# Patient Record
Sex: FEMALE | Race: WHITE
[De-identification: names, ages, dates, MRNs, and addresses within clinical notes are randomized per-mention and may not be internally consistent; named-entity substitution may affect disease eponyms.]

---

## 2019-01-07 ENCOUNTER — HOSPITAL ENCOUNTER (OUTPATIENT)
Dept: HOSPITAL 46 - DS | Age: 79
Discharge: HOME | End: 2019-01-07
Attending: UROLOGY
Payer: MEDICARE

## 2019-01-07 VITALS — WEIGHT: 145 LBS | BODY MASS INDEX: 23.3 KG/M2 | HEIGHT: 66 IN

## 2019-01-07 DIAGNOSIS — Z88.5: ICD-10-CM

## 2019-01-07 DIAGNOSIS — Z79.899: ICD-10-CM

## 2019-01-07 DIAGNOSIS — Z79.84: ICD-10-CM

## 2019-01-07 DIAGNOSIS — N32.81: Primary | ICD-10-CM

## 2019-01-07 DIAGNOSIS — M81.0: ICD-10-CM

## 2019-01-07 DIAGNOSIS — E11.9: ICD-10-CM

## 2019-01-07 PROCEDURE — 3E0K8GC INTRODUCTION OF OTHER THERAPEUTIC SUBSTANCE INTO GENITOURINARY TRACT, VIA NATURAL OR ARTIFICIAL OPENING ENDOSCOPIC: ICD-10-PCS | Performed by: UROLOGY

## 2019-01-07 NOTE — NUR
01/07/19 1249 Petra Taylor 1220 PT ARRIVED IN PACU AWAKE WITH NO C/O'S. 1230 OM REMOVED. SATS
93% ON RA. PT SIPPING ON WATER. BLOOD SUGAR 133 ON ARRIVAL. RX CALLED
TO [facility] IN [place]. 1245 UP TO BATHROOM. VOIDED.

## 2019-01-10 NOTE — OR
[facility]
                                    [address]
                                  [place], [place]  [address]
_________________________________________________________________________________________
                                                                 Signed   
 
 
DATE OF OPERATION:
01/07/2019
 
SURGEON:
Derrell Simmons MD
 
PREOPERATIVE DIAGNOSIS:
Severe overactive bladder with failure to respond to oral anticholinergic agents.
 
POSTOPERATIVE DIAGNOSIS:
Severe overactive bladder with failure to respond to oral anticholinergic agents.
 
PROCEDURES:
1. Diagnostic cystoscopy.
2. Botox bladder injection, 100 units.
 
ANESTHESIA:
MAC.
 
ESTIMATED BLOOD LOSS:
None.
 
COMPLICATIONS:
None.
 
SPECIMENS:
None.
 
DRAINS:
None.
 
INDICATIONS FOR PROCEDURE:
Ms. Al is a very pleasant 78-year-old female who is well known to me.  She has a
longstanding history of severe overactive bladder and has tried multiple oral
medications with only minimum improvement in her symptoms.  She is currently taking
trospium chloride and Myrbetriq in combination therapy and is only experiencing about
25% improvement in her symptoms.  After a long discussion of the risks and benefits of
Botox bladder injection, the patient has elected to undergo the procedure for more
definitive treatment of her severe overactive bladder. 
 
OPERATIVE FINDINGS:
On cystoscopy, there was no evidence of any suspicious masses, lesions, or stones within
 
    Electronically Signed By: DERRELL SIMMONS MD  01/10/19 1605
_________________________________________________________________________________________
PATIENT NAME:     EDWIN AL                    
MEDICAL RECORD #: [id number]            OPERATIVE REPORT              
          ACCT #: [id number]  
DATE OF BIRTH:   [birth year]            REPORT #: [id number]      
PHYSICIAN:        DERRELL SIMMONS MD               
PCP:              DARREN NIXON MD      
REPORT IS CONFIDENTIAL AND NOT TO BE RELEASED WITHOUT AUTHORIZATION
 
 
                                  [facility]
                                    [address], [place]  [address]
_________________________________________________________________________________________
                                                                 Signed   
 
 
the bladder.  Bilateral ureteral orifices are in their normal anatomic location and
effluxing clear urine. 
 
A total of 100 units of Botox was injected into the patient's bladder without
complication.  Total of 10 mL solution was injected in 0.5 mL aliquots throughout the
bladder, avoiding the dome and trigone areas.  This was performed without complication. 
 
DESCRIPTION OF PROCEDURE:
After informed consent was obtained, the patient was taken back to the operating room.
She was transferred from the [facility] to the operating room table, where MAC anesthesia
was induced.  She was placed in the dorsal lithotomy position and her genitalia prepped
and draped in a standard sterile fashion.  Using a 30-degree lens on a 22-1/2-Welsh
introducer, rigid cystoscope was inserted through the urethra into the bladder under
direct visualization.  Panendoscopic views of the bladder were then obtained.  Please
see above findings.  The patient's bladder was then irrigated with sterile water prior
to injection.  The injecting device was advanced into the bladder and the needle was set
at 3 mm.  I then injected a total of 10 mL of reconstituted botulinum toxin, at 0.5 mL
at a time.  The injections were placed mostly in the posterior lateral walls of the
bladder, avoiding the dome and trigone areas.  The procedure was performed without
difficulty.  At the end of the procedure, the patient's bladder was irrigated again and
then drained completely.  The procedure was then terminated.  The patient tolerated the
procedure well without any complication.  She will now be transferred to the
postanesthesia care unit in stable condition. 
 
DISPOSITION:
Ms. Al will be discharged to home later today in stable condition.  She was given
Macrobid 100 mg p.o. b.i.d. for a total of 7 days.  She will be scheduled to return to
clinic in approximately six weeks to undergo her first postoperative visit.  I have
instructed her today to stop both her trospium chloride and Myrbetriq in the next couple
of days or so. 
 
 
 
            ________________________________________
            Derrell Simmons MD 
 
 
AR/MODL
Job #:  [id number]
DD:  01/07/2019 19:12:03
DT:  01/07/2019 20:24:23
 
 
    Electronically Signed By: DERRELL SIMMONS MD  01/10/19 1605
_________________________________________________________________________________________
PATIENT NAME:     EDWIN AL                    
MEDICAL RECORD #: [id number]            OPERATIVE REPORT              
          ACCT #: [id number]  
DATE OF BIRTH:   [birth year]            REPORT #: [id number]      
PHYSICIAN:        DERRELL SIMMONS MD               
PCP:              DARREN NIXON MD      
REPORT IS CONFIDENTIAL AND NOT TO BE RELEASED WITHOUT AUTHORIZATION
 
 
                                  [address]  [address]
_________________________________________________________________________________________
                                                                 Signed   
 
 
Copies:                                
~
 
 
 
 
 
 
 
 
 
 
 
 
 
 
 
 
 
 
 
 
 
 
 
 
 
 
 
 
 
 
 
 
 
 
 
 
 
 
 
 
 
    Electronically Signed By: DERRELL SIMMONS MD  01/10/19 1605
_________________________________________________________________________________________
PATIENT NAME:     EDWIN AL                    
MEDICAL RECORD #: [id number]            OPERATIVE REPORT              
          ACCT #: [id number]  
DATE OF BIRTH:   [birth year]            REPORT #: [id number]      
PHYSICIAN:        DERRELL SIMMONS MD               
PCP:              DARREN NIXON MD      
REPORT IS CONFIDENTIAL AND NOT TO BE RELEASED WITHOUT AUTHORIZATION

## 2025-03-25 VITALS — SYSTOLIC BLOOD PRESSURE: 124 MMHG | DIASTOLIC BLOOD PRESSURE: 74 MMHG

## 2025-03-31 ENCOUNTER — HOSPITAL ENCOUNTER (OUTPATIENT)
Dept: HOSPITAL 46 - DS | Age: 85
Discharge: HOME | End: 2025-03-31
Attending: UROLOGY
Payer: MEDICARE

## 2025-03-31 VITALS — SYSTOLIC BLOOD PRESSURE: 126 MMHG | DIASTOLIC BLOOD PRESSURE: 72 MMHG

## 2025-03-31 VITALS — HEIGHT: 67 IN | BODY MASS INDEX: 22.8 KG/M2 | WEIGHT: 145.29 LBS

## 2025-03-31 VITALS — SYSTOLIC BLOOD PRESSURE: 111 MMHG | DIASTOLIC BLOOD PRESSURE: 54 MMHG

## 2025-03-31 DIAGNOSIS — Z79.84: ICD-10-CM

## 2025-03-31 DIAGNOSIS — N32.81: Primary | ICD-10-CM

## 2025-03-31 DIAGNOSIS — R32: ICD-10-CM

## 2025-03-31 DIAGNOSIS — E11.9: ICD-10-CM

## 2025-03-31 DIAGNOSIS — E78.00: ICD-10-CM

## 2025-03-31 DIAGNOSIS — Z79.899: ICD-10-CM

## 2025-03-31 DIAGNOSIS — I10: ICD-10-CM

## 2025-03-31 DIAGNOSIS — N39.42: ICD-10-CM

## 2025-03-31 DIAGNOSIS — Z88.5: ICD-10-CM

## 2025-03-31 DIAGNOSIS — M81.0: ICD-10-CM

## 2025-03-31 PROCEDURE — 01HY3MZ INSERTION OF NEUROSTIMULATOR LEAD INTO PERIPHERAL NERVE, PERCUTANEOUS APPROACH: ICD-10-PCS | Performed by: UROLOGY

## 2025-03-31 PROCEDURE — C1897 LEAD, NEUROSTIM TEST KIT: HCPCS

## 2025-03-31 PROCEDURE — C1778 LEAD, NEUROSTIMULATOR: HCPCS

## 2025-03-31 PROCEDURE — C1889 IMPLANT/INSERT DEVICE, NOC: HCPCS

## 2025-03-31 NOTE — NUR
03/31/25 1101 Petra Taylor 1000 PT ARRIVED IN PACU SLEEPY. BLOOD SUGAR 178. 1015 PT AWAKENS,
THEN FALLS BACK TO SLEEP. 1030 C/O URGE TO VOID. UP TO BATHROOM.
VOIDED. BACK AT BEDSIDE WAITING FOR [facility] REP TO COME SEE THEM. 1035
WARM BLANKET GIVEN AND SIPPING ON WATER. 1040 [facility] REP AT BEDSIDE
DOING BEDSIDE TEACHING WITH PT.

## 2025-04-01 NOTE — EKG
[facility]
                                    [address]
                                  [place], [place]  [address]
_________________________________________________________________________________________
                                                                 Signed   
 
 
Sinus rhythm with marked sinus arrhythmia
Otherwise normal ECG
When compared with ECG of 22-AUG-2018 08:40,
No significant change was found
Confirmed by Edmund Paz DO ([id number]) on 4/1/2025 12:13:26 PM
 
 
 
 
 
 
 
 
 
 
 
 
 
 
 
 
 
 
 
 
 
 
 
 
 
 
 
 
 
 
 
 
 
 
 
 
 
 
 
 
    Electronically Signed By: EDMUND PAZ DO  04/01/25 1213
_________________________________________________________________________________________
PATIENT NAME:     MABEL MAIERDIEGO RCUZ                    
MEDICAL RECORD #: [id number]                     Electrocardiogram             
          ACCT #: [id number]  
DATE OF BIRTH:   [birth year]                                       
PHYSICIAN:   EDMUND PAZ DO                     REPORT #: [id number]
REPORT IS CONFIDENTIAL AND NOT TO BE RELEASED WITHOUT AUTHORIZATION

## 2025-04-14 ENCOUNTER — HOSPITAL ENCOUNTER (OUTPATIENT)
Dept: HOSPITAL 46 - OPS | Age: 85
Discharge: HOME | End: 2025-04-14
Attending: UROLOGY
Payer: MEDICARE

## 2025-04-14 VITALS — DIASTOLIC BLOOD PRESSURE: 79 MMHG | SYSTOLIC BLOOD PRESSURE: 132 MMHG

## 2025-04-14 VITALS — DIASTOLIC BLOOD PRESSURE: 75 MMHG | SYSTOLIC BLOOD PRESSURE: 128 MMHG

## 2025-04-14 VITALS — BODY MASS INDEX: 22.6 KG/M2 | HEIGHT: 67 IN | WEIGHT: 144.01 LBS

## 2025-04-14 DIAGNOSIS — M81.0: ICD-10-CM

## 2025-04-14 DIAGNOSIS — Z88.8: ICD-10-CM

## 2025-04-14 DIAGNOSIS — N32.81: Primary | ICD-10-CM

## 2025-04-14 DIAGNOSIS — I10: ICD-10-CM

## 2025-04-14 DIAGNOSIS — E78.00: ICD-10-CM

## 2025-04-14 DIAGNOSIS — Z79.899: ICD-10-CM

## 2025-04-14 DIAGNOSIS — Z79.84: ICD-10-CM

## 2025-04-14 DIAGNOSIS — E11.9: ICD-10-CM

## 2025-04-14 PROCEDURE — C1787 PATIENT PROGR, NEUROSTIM: HCPCS

## 2025-04-14 PROCEDURE — 0JH70MZ INSERTION OF STIMULATOR GENERATOR INTO BACK SUBCUTANEOUS TISSUE AND FASCIA, OPEN APPROACH: ICD-10-PCS | Performed by: UROLOGY

## 2025-04-14 PROCEDURE — C1767 GENERATOR, NEURO NON-RECHARG: HCPCS
